# Patient Record
Sex: FEMALE | Race: WHITE | Employment: FULL TIME | URBAN - METROPOLITAN AREA
[De-identification: names, ages, dates, MRNs, and addresses within clinical notes are randomized per-mention and may not be internally consistent; named-entity substitution may affect disease eponyms.]

---

## 2024-01-14 ENCOUNTER — TELEMEDICINE (OUTPATIENT)
Dept: TELEHEALTH | Age: 49
End: 2024-01-14
Payer: COMMERCIAL

## 2024-01-14 DIAGNOSIS — R39.9 UTI SYMPTOMS: Primary | ICD-10-CM

## 2024-01-14 PROCEDURE — 99213 OFFICE O/P EST LOW 20 MIN: CPT | Performed by: NURSE PRACTITIONER

## 2024-01-14 RX ORDER — NITROFURANTOIN 25; 75 MG/1; MG/1
CAPSULE ORAL
Qty: 14 CAPSULE | Refills: 0 | Status: SHIPPED
Start: 2024-01-14

## 2024-01-14 RX ORDER — PHENAZOPYRIDINE HYDROCHLORIDE 200 MG/1
TABLET, FILM COATED ORAL
Qty: 6 TABLET | Refills: 0 | Status: SHIPPED | OUTPATIENT
Start: 2024-01-14

## 2024-01-14 NOTE — PROGRESS NOTES
CHIEF COMPLAINT:     UTI    HPI:   Guillermo Langston is a 48 year old female.  Patient presents via Video Visit on Demand.  Patient consents to conducting the visit virtually and acknowledges limitations without an in person examination.   Patient states that she developed urinary urgency and at about 10:00am this morning.  She increased her intake of water to see if she could improve any symptoms.  The symptoms have persisted.  Patient feel an 'itching' irritating pain ascend up to her bladder at the end of voiding..  She also has a sense of incomplete emptying with voiding.  Pt denies any abdominal, back or flank pain.  Denies any chills, fever, body aches or illness symptoms.  No nausea or vomiting.  Hx of rare UTIs.  Last 7/2022.    Current Outpatient Medications   Medication Sig Dispense Refill    nitrofurantoin monohydrate macro 100 MG Oral Cap Take 1 tablet by mouth two times a day for 7 days 14 capsule 0    phenazopyridine 200 MG Oral Tab Take 1 tablet every 8 hours with food, as needed for urinary burning. 6 tablet 0    levothyroxine 112 MCG Oral Tab Take 1 tablet (112 mcg total) by mouth daily. 90 tablet 1      Past Medical History:   Diagnosis Date    Hypothyroidism       Social History:  Social History     Socioeconomic History    Marital status: Single   Tobacco Use    Smoking status: Never    Smokeless tobacco: Never   Substance and Sexual Activity    Alcohol use: Yes    Drug use: Yes     Comment: CBD         REVIEW OF SYSTEMS:   GENERAL: See above  SKIN: no rashes, no skin wounds or ulcers.  GI: See HPI.    : See HPI.  NEURO: no headaches.    EXAM:     Video Visit on Demand    GENERAL: well developed, well nourished,in no apparent distress.  Patient is healthy appearing.   Patient is very pleasant and is able to provide an excellent HPI and ROS.    ASSESSMENT AND PLAN:   Guillermo Langston is a 48 year old female presents with UTI symptoms.    ASSESSMENT:    1. UTI symptoms    - nitrofurantoin  monohydrate macro 100 MG Oral Cap; Take 1 tablet by mouth two times a day for 7 days  Dispense: 14 capsule; Refill: 0  - phenazopyridine 200 MG Oral Tab; Take 1 tablet every 8 hours with food, as needed for urinary burning.  Dispense: 6 tablet; Refill: 0    To seek further evaluation if not improving with each day.      Consent given by patient to conduct the Video Visit.  Approx 10 minutes spend in direct patient evaluation.